# Patient Record
Sex: FEMALE | Race: WHITE | NOT HISPANIC OR LATINO | Employment: FULL TIME | URBAN - METROPOLITAN AREA
[De-identification: names, ages, dates, MRNs, and addresses within clinical notes are randomized per-mention and may not be internally consistent; named-entity substitution may affect disease eponyms.]

---

## 2024-08-15 ENCOUNTER — NURSE TRIAGE (OUTPATIENT)
Age: 29
End: 2024-08-15

## 2024-08-15 ENCOUNTER — OFFICE VISIT (OUTPATIENT)
Age: 29
End: 2024-08-15
Payer: COMMERCIAL

## 2024-08-15 ENCOUNTER — APPOINTMENT (OUTPATIENT)
Dept: LAB | Age: 29
End: 2024-08-15
Payer: COMMERCIAL

## 2024-08-15 VITALS
BODY MASS INDEX: 24.11 KG/M2 | WEIGHT: 131 LBS | HEIGHT: 62 IN | DIASTOLIC BLOOD PRESSURE: 60 MMHG | SYSTOLIC BLOOD PRESSURE: 110 MMHG

## 2024-08-15 DIAGNOSIS — N92.6 MISSED MENSES: Primary | ICD-10-CM

## 2024-08-15 DIAGNOSIS — N92.6 MISSED MENSES: ICD-10-CM

## 2024-08-15 LAB
B-HCG SERPL-ACNC: <0.6 MIU/ML (ref 0–5)
TSH SERPL DL<=0.05 MIU/L-ACNC: 0.66 UIU/ML (ref 0.45–4.5)

## 2024-08-15 PROCEDURE — 99213 OFFICE O/P EST LOW 20 MIN: CPT | Performed by: OBSTETRICS & GYNECOLOGY

## 2024-08-15 PROCEDURE — 84702 CHORIONIC GONADOTROPIN TEST: CPT

## 2024-08-15 PROCEDURE — 84443 ASSAY THYROID STIM HORMONE: CPT | Performed by: OBSTETRICS & GYNECOLOGY

## 2024-08-15 PROCEDURE — 36415 COLL VENOUS BLD VENIPUNCTURE: CPT | Performed by: OBSTETRICS & GYNECOLOGY

## 2024-08-15 NOTE — TELEPHONE ENCOUNTER
Patient calling to report late missed cycle. Patient stopped OCP last August and cycle returned in October. Cycle has been fairly regular - comes every 28--34 days, bleeds for 4 days. No changes to lifestyle, no weight loss, or increase stress. Negative home UPT. Starting to TTC but worried about cycle. Appointment scheduled for patient to discuss.

## 2024-08-15 NOTE — PROGRESS NOTES
Assessment Ana was seen today for amenorrhea.    Diagnoses and all orders for this visit:    Missed menses  -     TSH, 3rd generation  -     hCG, quantitative; Future      We discussed likely  anovulatory month therefore no cycle   Recommend expectant management for one more month and see if gets cycle this August      Subjective   Ana Roche is a 29 y.o. female here for a problem visit.  Patient is complaining of missed menses  in  July . LMP was in June 28 . Has not had a cycle since and negative pregnancy test  . States she is attempting to conceive. Stopped OCP in October and had been having regular cycles until now   There is no problem list on file for this patient.      Gynecologic History  Patient's last menstrual period was 06/29/2024 (exact date).  The current method of family planning is none.    Past Medical History:   Diagnosis Date    Abnormal Pap smear of cervix     HPV (human papilloma virus) infection     Urinary tract infection      Past Surgical History:   Procedure Laterality Date    CERVICAL BIOPSY  W/ LOOP ELECTRODE EXCISION      COLPOSCOPY      WISDOM TOOTH EXTRACTION  2011     History reviewed. No pertinent family history.  Social History     Socioeconomic History    Marital status: /Civil Union     Spouse name: Not on file    Number of children: Not on file    Years of education: Not on file    Highest education level: Not on file   Occupational History    Not on file   Tobacco Use    Smoking status: Never    Smokeless tobacco: Never   Substance and Sexual Activity    Alcohol use: Yes     Comment: SOCIAL     Drug use: No    Sexual activity: Yes     Partners: Male   Other Topics Concern    Not on file   Social History Narrative    Not on file     Social Determinants of Health     Financial Resource Strain: Low Risk  (7/10/2024)    Received from [facility]    Overall Financial Resource Strain (CARDIA)     Difficulty of Paying Living Expenses: Not hard at all    Food Insecurity: No Food Insecurity (7/10/2024)    Received from [facility]    Hunger Vital Sign     Worried About Running Out of Food in the Last Year: Never true     Ran Out of Food in the Last Year: Never true   Transportation Needs: No Transportation Needs (7/10/2024)    Received from [facility]    PRAPARE - Transportation     Lack of Transportation (Medical): No     Lack of Transportation (Non-Medical): No   Physical Activity: Not on file   Stress: No Stress Concern Present (7/10/2024)    Received from [facility]    [employer] - Occupational Stress Questionnaire     Feeling of Stress : Only a little   Social Connections: Feeling Socially Integrated (7/10/2024)    Received from [facility]    OASIS : Social Isolation     How often do you feel lonely or isolated from those around you?: Rarely   Intimate Partner Violence: Not At Risk (7/10/2024)    Received from [facility]    Humiliation, Afraid, Rape, and Kick questionnaire     Fear of Current or Ex-Partner: No     Emotionally Abused: No     Physically Abused: No     Sexually Abused: No   Housing Stability: Low Risk  (7/10/2024)    Received from [facility]    Housing Stability Vital Sign     Unable to Pay for Housing in the Last Year: No     Number of Times Moved in the Last Year: 1     Homeless in the Last Year: No     No Known Allergies    Current Outpatient Medications:     cephalexin (KEFLEX) 250 mg capsule, Take 250 mg by mouth 3 (three) times a day (Patient not taking: Reported on 8/15/2024), Disp: , Rfl:     Drospiren-Eth Estrad-Levomefol 3-0.02-0.451 MG TABS, Take 1 tablet by mouth daily (Patient not taking: Reported on 11/6/2023), Disp: 84 tablet, Rfl: 0    Review of Systems  Constitutional :no fever, feels well, no tiredness, no recent weight gain or loss  ENT: no ear ache, no loss of hearing, no nosebleeds or  "nasal discharge, no sore throat or hoarseness.  Cardiovascular: no complaints of slow or fast heart beat, no chest pain, no palpitations, no leg claudication or lower extremity edema.  Respiratory: no complaints of shortness of shortness of breath, no MORGAN  Breasts:no complaints of breast pain, breast lump, or nipple discharge  Gastrointestinal: no complaints of abdominal pain, constipation, nausea, vomiting, or diarrhea or bloody stools  Genitourinary : as noted in HPI.  Musculoskeletal: no complaints of arthralgia, no myalgia, no joint swelling or stiffness, no limb pain or swelling.  Integumentary: no complaints of skin rash or lesion, itching or dry skin  Neurological: no complaints of headache, no confusion, no numbness or tingling, no dizziness or fainting     Objective     /60   Ht 5' 2\" (1.575 m)   Wt 59.4 kg (131 lb)   LMP 06/29/2024 (Exact Date)   BMI 23.96 kg/m²     General:   appears stated age, cooperative, alert normal mood and affect   Lungs: Unlabored     Skin normal skin turgor and no rashes.   Psychiatric orientation to person, place, and time: normal. mood and affect: normal      "

## 2024-08-15 NOTE — TELEPHONE ENCOUNTER
Regarding: pt is 2 weeks late and pregnancy test negative  ----- Message from Alaina GUZMAN sent at 8/15/2024  8:23 AM EDT -----  Patient called and she has been trying to conceive past year and she is  2 weeks late and pregnancy test show negative  and there are no openings until september .  She is concerned that she did not get her period.  Please call her back at [phone]. Thank you

## 2024-08-15 NOTE — TELEPHONE ENCOUNTER
"Reason for Disposition  • Missed 2 or more periods in a row    Answer Assessment - Initial Assessment Questions  1. LMP:  \"When did your last menstrual period begin?\"      6/30/2024  2. DAYS LATE: \"How many days late is your menstrual period?\"      2 weeks  3. REGULARITY: \"How regular are your periods?\"      Usually 28-34 days, bleeds for 4 days  4. PREGNANCY: \"Is there any chance you are pregnant?\" (e.g., unprotected intercourse, missed birth control pill, broken condom) \"Have you used a home pregnancy test?\"      TTC - negative home test  5. BREASTFEEDING: \"Are you breastfeeding?\"      Denies  6. BIRTH CONTROL PILLS: \"Are you taking birth control pills, or have you stopped recently?\"      No  7. DEPOPROVERA: \"Has your doctor given you a shot to prevent pregnancy?\" (e.g., Depoprovera injection)      No  8. CAUSE: \"What do you think caused the missed period?\" (e.g., stress, rapid weight loss, excessive exercise)      Unsure  9. OTHER SYMPTOMS: \"Do you have any other symptoms?\" (e.g., abdominal pain)      Denies    Protocols used: Menstrual Period - Missed or Late-ADULT-[place]    "

## 2024-11-18 NOTE — PROGRESS NOTES
A/P    1.  Annual exam    Last PAP - 9/28/2021- neg   Next due today    Scheduling of pap discussed in detail     2.  Attempt to get pregnant  -    no sperm and working with Vishnu gaines.           29 y.o.,No LMP recorded.  C/O no gyn concerns currently     Summer cycles were irregular better now      Past medical / social / surgical / family history reviewed and updated   Medication and allergies discussed in detail and updated     Review of Systems - History obtained from chart review and the patient  General ROS: negative  Psychological ROS: negative  Ophthalmic ROS: negative  ENT ROS: negative  Allergy and Immunology ROS: negative  Hematological and Lymphatic ROS: negative  Endocrine ROS: negative  Breast ROS: negative for breast lumps  Respiratory ROS: no cough, shortness of breath, or wheezing  Cardiovascular ROS: no chest pain or dyspnea on exertion  Gastrointestinal ROS: no abdominal pain, change in bowel habits, or black or bloody stools  Genito-Urinary ROS: no dysuria, trouble voiding, or hematuria  Musculoskeletal ROS: negative  Neurological ROS: no TIA or stroke symptoms  Dermatological ROS: negative        Physical Exam  Vitals reviewed. Exam conducted with a chaperone present.   Constitutional:       Appearance: She is well-developed.   Neck:      Thyroid: No thyromegaly.   Cardiovascular:      Rate and Rhythm: Normal rate.      Heart sounds: Normal heart sounds.   Pulmonary:      Effort: Pulmonary effort is normal. No accessory muscle usage or respiratory distress.      Breath sounds: Normal air entry.   Chest:      Chest wall: No tenderness.   Breasts:     Breasts are symmetrical.      Right: No inverted nipple, mass or tenderness.      Left: No inverted nipple, mass or tenderness.   Abdominal:      General: There is no distension.      Palpations: Abdomen is soft.      Tenderness: There is no abdominal tenderness. There is no right CVA tenderness, left CVA tenderness, guarding or rebound.    Genitourinary:     General: Normal vulva.      Exam position: Lithotomy position.      Labia:         Right: No rash, tenderness, lesion or injury.         Left: No rash, tenderness, lesion or injury.       Vagina: Normal. No foreign body. No vaginal discharge or bleeding.      Cervix: Normal.      Uterus: Normal. Not enlarged and not fixed.       Adnexa: Right adnexa normal and left adnexa normal.        Right: No mass, tenderness or fullness.          Left: No mass, tenderness or fullness.        Rectum: No external hemorrhoid.   Musculoskeletal:      Cervical back: Normal range of motion.   Lymphadenopathy:      Cervical: No cervical adenopathy.      Upper Body:      Right upper body: No supraclavicular adenopathy.      Left upper body: No supraclavicular adenopathy.   Neurological:      Mental Status: She is alert and oriented to person, place, and time.   Psychiatric:         Speech: Speech normal.         Behavior: Behavior normal.         Thought Content: Thought content normal.         Judgment: Judgment normal.

## 2024-11-20 ENCOUNTER — ANNUAL EXAM (OUTPATIENT)
Dept: OBGYN CLINIC | Facility: MEDICAL CENTER | Age: 29
End: 2024-11-20
Payer: COMMERCIAL

## 2024-11-20 VITALS
HEIGHT: 62 IN | WEIGHT: 137 LBS | BODY MASS INDEX: 25.21 KG/M2 | DIASTOLIC BLOOD PRESSURE: 60 MMHG | SYSTOLIC BLOOD PRESSURE: 116 MMHG

## 2024-11-20 DIAGNOSIS — Z12.4 SCREENING FOR CERVICAL CANCER: ICD-10-CM

## 2024-11-20 DIAGNOSIS — Z01.419 WOMEN'S ANNUAL ROUTINE GYNECOLOGICAL EXAMINATION: Primary | ICD-10-CM

## 2024-11-20 PROCEDURE — G0476 HPV COMBO ASSAY CA SCREEN: HCPCS | Performed by: OBSTETRICS & GYNECOLOGY

## 2024-11-20 PROCEDURE — G0145 SCR C/V CYTO,THINLAYER,RESCR: HCPCS | Performed by: OBSTETRICS & GYNECOLOGY

## 2024-11-20 PROCEDURE — S0612 ANNUAL GYNECOLOGICAL EXAMINA: HCPCS | Performed by: OBSTETRICS & GYNECOLOGY

## 2024-11-20 RX ORDER — PRENATAL VIT 49/IRON FUM/FOLIC 6.75-0.2MG
TABLET ORAL
COMMUNITY

## 2024-11-21 LAB
HPV HR 12 DNA CVX QL NAA+PROBE: NEGATIVE
HPV16 DNA CVX QL NAA+PROBE: NEGATIVE
HPV18 DNA CVX QL NAA+PROBE: NEGATIVE

## 2024-11-25 LAB
LAB AP GYN PRIMARY INTERPRETATION: NORMAL
Lab: NORMAL

## 2024-11-29 ENCOUNTER — RESULTS FOLLOW-UP (OUTPATIENT)
Dept: OBGYN CLINIC | Facility: MEDICAL CENTER | Age: 29
End: 2024-11-29

## 2024-12-19 LAB
EXTERNAL HIV SCREEN: NORMAL
HBA1C MFR BLD HPLC: 5.1 %
HCV AB SER-ACNC: NON REACTIVE

## 2025-06-10 ENCOUNTER — TELEPHONE (OUTPATIENT)
Age: 30
End: 2025-06-10

## 2025-06-10 NOTE — TELEPHONE ENCOUNTER
Patient called in to schedule visit for new pregnancy. Her lmp is 5/10/2025 and her timeframe for D&V is 7/5/25-7/19/25. I was not able to find anything on the schedule within the timeframe. Please call patient to schedule.

## 2025-06-11 NOTE — TELEPHONE ENCOUNTER
Who called:STAFF     Is the patient Pregnant ?Yes  If so, How many weeks? 4wks    Reason for the Call:Schedule D&V appt.    Action Taken:Spoke to patient      Outcome/Plan/ Recommendations:  Scheduled on 7/7 as per LM

## 2025-07-07 ENCOUNTER — ULTRASOUND (OUTPATIENT)
Dept: OBGYN CLINIC | Facility: CLINIC | Age: 30
End: 2025-07-07

## 2025-07-07 VITALS
SYSTOLIC BLOOD PRESSURE: 112 MMHG | WEIGHT: 135 LBS | HEIGHT: 62 IN | DIASTOLIC BLOOD PRESSURE: 60 MMHG | BODY MASS INDEX: 24.84 KG/M2

## 2025-07-07 DIAGNOSIS — Z34.91 FIRST TRIMESTER PREGNANCY: ICD-10-CM

## 2025-07-07 DIAGNOSIS — Z32.01 POSITIVE URINE PREGNANCY TEST: Primary | ICD-10-CM

## 2025-07-07 PROCEDURE — 76817 TRANSVAGINAL US OBSTETRIC: CPT | Performed by: OBSTETRICS & GYNECOLOGY

## 2025-07-07 PROCEDURE — 99213 OFFICE O/P EST LOW 20 MIN: CPT | Performed by: OBSTETRICS & GYNECOLOGY

## 2025-07-07 NOTE — PROGRESS NOTES
Assessment  29 y.o.  presenting for amenorrhea. Pregnancy confirmed, dating consistent with LMP. INDIRA 2026 .    Plan  Diagnoses and all orders for this visit:    Positive urine pregnancy test  -     AMB US OB < 14 weeks single or first gestation level 1    First trimester pregnancy  - Prenatal vitamin  - Continue prog to 10wk EGA  - Discussed and referred for genetic screening  - Prenatal Nursing Intake in 2 weeks  - Prenatal H&P in 4 weeks     ____________________________________________________________      Subjective   Ana Roche is a 29 y.o.  with an LMP of 5/10/25 (8w2d by LMP) who presents for amenorrhea. She notes she took a home pregnancy test and it was positive. She is currently otherwise reporting expected 1st tri symptoms (nausea, constipation, fatigue). Mild and manageable.    Patient notes that this pregnancy was unplanned. 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 Partner stopped T supplements and was able to TTC naturally with aid of letrozole for timing. Followed with PAULINA in early  tri too, on prog presently. She reports she is certain of her LMP and that she has regular menses. She has has no vaginal bleeding since her LMP.  First pregnancy.     The following portions of the patient's history were reviewed and updated as appropriate: allergies, current medications, past medical history, past social history, past surgical history, and problem list.    Review of Systems  Review of Systems   Constitutional:  Positive for fatigue. Negative for chills and fever.   Respiratory:  Negative for shortness of breath.    Cardiovascular:  Negative for chest pain and palpitations.   Gastrointestinal:  Positive for constipation and nausea. Negative for abdominal distention, abdominal pain, diarrhea and vomiting.   Genitourinary:  Positive for menstrual problem (amenorrhea). Negative for dyspareunia, dysuria, flank pain,  "frequency, genital sores, pelvic pain, vaginal bleeding, vaginal discharge and vaginal pain.   Neurological:  Negative for dizziness, light-headedness and headaches.          Objective  /60   Ht 5' 2\" (1.575 m)   Wt 61.2 kg (135 lb)   LMP 05/10/2025 (Exact Date)   BMI 24.69 kg/m²       Physical Exam:  Physical Exam  Vitals reviewed. Exam conducted with a chaperone present.   Constitutional:       General: She is not in acute distress.     Appearance: Normal appearance. She is not ill-appearing, toxic-appearing or diaphoretic.     Eyes:      General: No scleral icterus.        Right eye: No discharge.         Left eye: No discharge.      Conjunctiva/sclera: Conjunctivae normal.       Cardiovascular:      Rate and Rhythm: Normal rate.   Pulmonary:      Effort: Pulmonary effort is normal. No respiratory distress.   Abdominal:      General: There is no distension.      Palpations: There is no mass.      Tenderness: There is no abdominal tenderness. There is no guarding or rebound.      Hernia: No hernia is present.   Genitourinary:     General: Normal vulva.      Exam position: Lithotomy position.      Labia:         Right: No rash, tenderness or lesion.         Left: No rash, tenderness or lesion.       Urethra: No prolapse, urethral swelling or urethral lesion.      Vagina: No bleeding.     Musculoskeletal:         General: No swelling.     Skin:     General: Skin is warm and dry.      Coloration: Skin is not jaundiced or pale.      Findings: No bruising or erythema.     Neurological:      Mental Status: She is alert.     Psychiatric:         Mood and Affect: Mood normal.         Behavior: Behavior normal.         Thought Content: Thought content normal.         Judgment: Judgment normal.           "

## 2025-07-28 ENCOUNTER — INITIAL PRENATAL (OUTPATIENT)
Dept: OBGYN CLINIC | Facility: MEDICAL CENTER | Age: 30
End: 2025-07-28

## 2025-07-28 ENCOUNTER — APPOINTMENT (OUTPATIENT)
Dept: LAB | Facility: MEDICAL CENTER | Age: 30
End: 2025-07-28
Attending: OBSTETRICS & GYNECOLOGY
Payer: COMMERCIAL

## 2025-07-28 VITALS
SYSTOLIC BLOOD PRESSURE: 102 MMHG | DIASTOLIC BLOOD PRESSURE: 62 MMHG | BODY MASS INDEX: 24.92 KG/M2 | WEIGHT: 135.4 LBS | HEIGHT: 62 IN

## 2025-07-28 DIAGNOSIS — Z34.01 PRENATAL CARE, FIRST PREGNANCY, FIRST TRIMESTER: Primary | ICD-10-CM

## 2025-07-28 DIAGNOSIS — Z34.01 PRENATAL CARE, FIRST PREGNANCY, FIRST TRIMESTER: ICD-10-CM

## 2025-07-28 LAB
ABO GROUP BLD: NORMAL
BASOPHILS # BLD AUTO: 0.05 THOUSANDS/ÂΜL (ref 0–0.1)
BASOPHILS NFR BLD AUTO: 1 % (ref 0–1)
BILIRUB UR QL STRIP: NEGATIVE
BLD GP AB SCN SERPL QL: NEGATIVE
CLARITY UR: CLEAR
COLOR UR: NORMAL
EOSINOPHIL # BLD AUTO: 0.07 THOUSAND/ÂΜL (ref 0–0.61)
EOSINOPHIL NFR BLD AUTO: 1 % (ref 0–6)
ERYTHROCYTE [DISTWIDTH] IN BLOOD BY AUTOMATED COUNT: 12.9 % (ref 11.6–15.1)
GLUCOSE UR STRIP-MCNC: NEGATIVE MG/DL
HBV SURFACE AB SER-ACNC: 4.61 MIU/ML
HBV SURFACE AG SER QL: NORMAL
HCT VFR BLD AUTO: 38.1 % (ref 34.8–46.1)
HCV AB SER QL: NORMAL
HGB BLD-MCNC: 12.8 G/DL (ref 11.5–15.4)
HGB UR QL STRIP.AUTO: NEGATIVE
HIV 1+2 AB+HIV1 P24 AG SERPL QL IA: NORMAL
IMM GRANULOCYTES # BLD AUTO: 0.07 THOUSAND/UL (ref 0–0.2)
IMM GRANULOCYTES NFR BLD AUTO: 1 % (ref 0–2)
KETONES UR STRIP-MCNC: NEGATIVE MG/DL
LEUKOCYTE ESTERASE UR QL STRIP: NEGATIVE
LYMPHOCYTES # BLD AUTO: 2.04 THOUSANDS/ÂΜL (ref 0.6–4.47)
LYMPHOCYTES NFR BLD AUTO: 21 % (ref 14–44)
MCH RBC QN AUTO: 29.2 PG (ref 26.8–34.3)
MCHC RBC AUTO-ENTMCNC: 33.6 G/DL (ref 31.4–37.4)
MCV RBC AUTO: 87 FL (ref 82–98)
MONOCYTES # BLD AUTO: 0.37 THOUSAND/ÂΜL (ref 0.17–1.22)
MONOCYTES NFR BLD AUTO: 4 % (ref 4–12)
NEUTROPHILS # BLD AUTO: 7.08 THOUSANDS/ÂΜL (ref 1.85–7.62)
NEUTS SEG NFR BLD AUTO: 72 % (ref 43–75)
NITRITE UR QL STRIP: NEGATIVE
NRBC BLD AUTO-RTO: 0 /100 WBCS
PH UR STRIP.AUTO: 7.5 [PH]
PLATELET # BLD AUTO: 255 THOUSANDS/UL (ref 149–390)
PMV BLD AUTO: 11.1 FL (ref 8.9–12.7)
PROT UR STRIP-MCNC: NEGATIVE MG/DL
RBC # BLD AUTO: 4.38 MILLION/UL (ref 3.81–5.12)
RH BLD: POSITIVE
RUBV IGG SERPL IA-ACNC: 12.3 IU/ML
SP GR UR STRIP.AUTO: 1.01 (ref 1–1.03)
SPECIMEN EXPIRATION DATE: NORMAL
TREPONEMA PALLIDUM IGG+IGM AB [PRESENCE] IN SERUM OR PLASMA BY IMMUNOASSAY: NORMAL
UROBILINOGEN UR STRIP-ACNC: <2 MG/DL
WBC # BLD AUTO: 9.68 THOUSAND/UL (ref 4.31–10.16)

## 2025-07-28 PROCEDURE — 87086 URINE CULTURE/COLONY COUNT: CPT

## 2025-07-28 PROCEDURE — 86803 HEPATITIS C AB TEST: CPT

## 2025-07-28 PROCEDURE — 81003 URINALYSIS AUTO W/O SCOPE: CPT

## 2025-07-28 PROCEDURE — 86706 HEP B SURFACE ANTIBODY: CPT

## 2025-07-28 PROCEDURE — 36415 COLL VENOUS BLD VENIPUNCTURE: CPT

## 2025-07-28 PROCEDURE — 83020 HEMOGLOBIN ELECTROPHORESIS: CPT

## 2025-07-28 PROCEDURE — 86901 BLOOD TYPING SEROLOGIC RH(D): CPT

## 2025-07-28 PROCEDURE — 87340 HEPATITIS B SURFACE AG IA: CPT

## 2025-07-28 PROCEDURE — 85025 COMPLETE CBC W/AUTO DIFF WBC: CPT

## 2025-07-28 PROCEDURE — 86762 RUBELLA ANTIBODY: CPT

## 2025-07-28 PROCEDURE — 86780 TREPONEMA PALLIDUM: CPT

## 2025-07-28 PROCEDURE — 86850 RBC ANTIBODY SCREEN: CPT

## 2025-07-28 PROCEDURE — 87389 HIV-1 AG W/HIV-1&-2 AB AG IA: CPT

## 2025-07-28 PROCEDURE — OBC

## 2025-07-28 PROCEDURE — 86900 BLOOD TYPING SEROLOGIC ABO: CPT

## 2025-07-29 ENCOUNTER — TELEPHONE (OUTPATIENT)
Age: 30
End: 2025-07-29

## 2025-07-30 LAB
BACTERIA UR CULT: NORMAL
HGB A MFR BLD: 2.9 % (ref 1.8–3.2)
HGB A MFR BLD: 97.1 % (ref 96.4–98.8)
HGB F MFR BLD: 0 % (ref 0–2)
HGB FRACT BLD-IMP: NORMAL
HGB S MFR BLD: 0 %

## 2025-08-05 ENCOUNTER — ANCILLARY PROCEDURE (OUTPATIENT)
Dept: PERINATAL CARE | Facility: CLINIC | Age: 30
End: 2025-08-05
Attending: OBSTETRICS & GYNECOLOGY
Payer: COMMERCIAL

## 2025-08-05 VITALS
BODY MASS INDEX: 25.14 KG/M2 | WEIGHT: 136.6 LBS | OXYGEN SATURATION: 98 % | HEART RATE: 84 BPM | SYSTOLIC BLOOD PRESSURE: 112 MMHG | HEIGHT: 62 IN | DIASTOLIC BLOOD PRESSURE: 62 MMHG

## 2025-08-05 DIAGNOSIS — Z3A.12 12 WEEKS GESTATION OF PREGNANCY: ICD-10-CM

## 2025-08-05 DIAGNOSIS — Z36.0 ENCOUNTER FOR ANTENATAL SCREENING FOR CHROMOSOMAL ANOMALIES: Primary | ICD-10-CM

## 2025-08-05 DIAGNOSIS — Z34.91 FIRST TRIMESTER PREGNANCY: ICD-10-CM

## 2025-08-05 PROCEDURE — 76813 OB US NUCHAL MEAS 1 GEST: CPT | Performed by: STUDENT IN AN ORGANIZED HEALTH CARE EDUCATION/TRAINING PROGRAM

## 2025-08-05 PROCEDURE — 36415 COLL VENOUS BLD VENIPUNCTURE: CPT | Performed by: STUDENT IN AN ORGANIZED HEALTH CARE EDUCATION/TRAINING PROGRAM

## 2025-08-05 PROCEDURE — 99203 OFFICE O/P NEW LOW 30 MIN: CPT | Performed by: STUDENT IN AN ORGANIZED HEALTH CARE EDUCATION/TRAINING PROGRAM

## 2025-08-09 LAB
CFDNA.FET/CFDNA.TOTAL SFR FETUS: NORMAL %
CFDNA.FET/CFDNA.TOTAL SFR FETUS: NORMAL %
CITATION REF LAB TEST: NORMAL
FET 13+18+21+X+Y ANEUP PLAS.CFDNA: NEGATIVE
FET CHR 21 TS PLAS.CFDNA QL: NEGATIVE
FET CHR 21 TS PLAS.CFDNA QL: NEGATIVE
FET MS X RISK WBC.DNA+CFDNA QL: NOT DETECTED
FET SEX PLAS.CFDNA DOSAGE CFDNA: NORMAL
FET TS 13 RISK PLAS.CFDNA QL: NEGATIVE
FET X + Y ANEUP RISK PLAS.CFDNA SEQ-IMP: NOT DETECTED
GA EST FROM CONCEPTION DATE: NORMAL D
GESTATIONAL AGE > 9:: YES
LAB DIRECTOR NAME PROVIDER: NORMAL
LABORATORY COMMENT REPORT: NORMAL
LIMITATIONS OF THE TEST: NORMAL
NEGATIVE PREDICTIVE VALUE: NORMAL
PERFORMANCE CHARACTERISTICS: NORMAL
POSITIVE PREDICTIVE VALUE: NORMAL
REF LAB TEST METHOD: NORMAL
SL AMB NOTE:: NORMAL
TEST PERFORMANCE INFO SPEC: NORMAL